# Patient Record
(demographics unavailable — no encounter records)

---

## 2018-09-27 NOTE — EMERGENCY ROOM REPORT
History of Present Illness


General


Chief Complaint:  Alcohol Intoxication


Source:  Patient





Present Illness


HPI


Patient presents with reports of alcohol ingestion


Patient himself initially was somewhat arousable and did admit to drinking 

alcohol over soon becomes more somnolent





Paramedics denies any trauma at the scene


History of present illness is mildly limited as the patient awakens for minimal 

questioning but soon after falls asleep





There was no reports of any vomiting at the scene patient at this time denies 

any chest pain





Patient History


Limited by:  medical condition


Past Medical History:  see triage record


Pertinent Family History:  none


Reviewed Nursing Documentation:  PMH: Agreed; PSxH: Agreed





Nursing Documentation-PM


Past Medical History:  No Stated History





Review of Systems


All Other Systems:  limited - Other than the ones mentioned in the history of 

present illness all others are reviewed however they do stay limited due to the 

patient's mental status





Physical Exam





Vital Signs








  Date Time  Temp Pulse Resp B/P (MAP) Pulse Ox O2 Delivery O2 Flow Rate FiO2


 


9/26/18 22:03  95 20 131/85 98 Room Air  








Sp02 EP Interpretation:  reviewed, normal


General Appearance:  no apparent distress - However mildly disheveled


Head:  normocephalic, atraumatic


Eyes:  bilateral eye PERRL, bilateral eye EOMI


ENT:  normal pharynx, no angioedema


Neck:  supple


Respiratory:  lungs clear


Cardiovascular #1:  regular rate, rhythm


Gastrointestinal:  non tender, soft, no mass


Musculoskeletal:  other - Patient awakens minimally, moves all extremities 

without focal deficit


Neurologic:  other - Patient responsive to physical and verbal stimuli, however 

becomes somnolent and resting soon after


Skin:  normal color, no rash


Lymphatic:  no adenopathy





Medical Decision Making


Diagnostic Impression:  


 Primary Impression:  


 Alcohol abuse


ER Course


Patient does have high order of alcohol on his breath as well





At this time admits to drinking alcohol


Patient is responsive maintaining appropriate airway





No obvious focality is seen and patient has no other obvious scalp hematoma or 

trauma patient is allowed to rest further and pending further sobering





After prolonged observation in the morning time patient has awakened


Reports that he had drank alcohol heavily last night


However at this time feels significantly improved patient is speaking clearly 

ambulating appropriately


And reports that he will be taking the bus home





Last Vital Signs








  Date Time  Temp Pulse Resp B/P (MAP) Pulse Ox O2 Delivery O2 Flow Rate FiO2


 


9/26/18 22:03  95 20 131/85 98 Room Air  








Status:  improved


Disposition:  HOME, SELF-CARE


Condition:  Improved


Referrals:  


NOT CHOSEN [facility],REFERRING (PCP)





Additional Instructions:  


Patient is provided with the discharge instructions notified to follow up with 

primary doctor in the next 2-3 days otherwise return to the er with any 

worsening symptoms.


Please note that this report is being documented using DRAGON technology.  This 

can lead to erroneous entry secondary to incorrect interpretation by the 

dictating instrument.











Cora Sims DO Sep 27, 2018 01:54

## 2018-09-28 NOTE — EMERGENCY ROOM REPORT
History of Present Illness


General


Chief Complaint:  Alcohol Intoxication


Source:  Patient





Present Illness


HPI


Patient present by paramedics for sleeping on the street


Am told by nursing staff and hospital staff that the patient was just recently 

disposition from the hospital





He was also dispositioned earlier in the morning on the 27th on my shift


However this appears to be a second visit earlier in the evening


I do not see it on his current medical records however





Patient is awake at this time he appears significantly more sober than his 

initial presentation yesterday


Patient is awake and reports that he was drinking earlier today


Allergies:  


Coded Allergies:  


     No Known Allergies (Unverified , 9/28/18)





Patient History


Past Medical History:  see triage record


Pertinent Family History:  none


Reviewed Nursing Documentation:  PMH: Agreed; PSxH: Agreed





Nursing Documentation-PMH


Past Medical History:  No Stated History





Review of Systems


All Other Systems:  negative except mentioned in HPI





Physical Exam





Vital Signs








  Date Time  Temp Pulse Resp B/P (MAP) Pulse Ox O2 Delivery O2 Flow Rate FiO2


 


9/28/18 01:15 98.5 100 18 144/100 98 Room Air  





 98.4       








Sp02 EP Interpretation:  reviewed, normal


General Appearance:  well appearing, no apparent distress


Head:  normocephalic, atraumatic


Eyes:  bilateral eye PERRL, bilateral eye EOMI


ENT:  hearing grossly normal, normal pharynx, TMs + canals normal, uvula midline


Neck:  full range of motion, supple, no meningismus, no bony tend


Respiratory:  lungs clear, normal breath sounds, no rhonchi, no respiratory 

distress, no retraction, no accessory muscle use


Cardiovascular #1:  normal peripheral pulses, regular rate, rhythm, no edema, 

no gallop, no JVD, no murmur


Gastrointestinal:  normal bowel sounds, non tender, soft, no mass, no 

organomegaly, non-distended, no guarding, no hernia, no pulsatile mass, no 

rebound


Genitourinary:  no CVA tenderness


Musculoskeletal:  normal inspection


Neurologic:  oriented x3, responsive, CNs III-XII nml as tested, motor strength/

tone normal, sensory intact


Psychiatric:  mood/affect normal


Skin:  normal color, no rash, warm/dry, palpation normal


Lymphatic:  normal inspection, no adenopathy





Medical Decision Making


Diagnostic Impression:  


 Primary Impression:  


 Alcohol abuse


ER Course


Given the patient's GCS 15


Awake and alert status


Patient is allowed to rest


He also admits to drinking alcohol


However there is no sign of vomiting or diarrhea


And the patient is dispositioned in the morning for close outpatient follow-up





Last Vital Signs








  Date Time  Temp Pulse Resp B/P (MAP) Pulse Ox O2 Delivery O2 Flow Rate FiO2


 


9/28/18 01:24 98.5 100 18 144/100 98 Room Air  





 98.5       








Status:  improved


Disposition:  HOME, SELF-CARE


Condition:  Improved


Referrals:  


[facility],REFERRING (PCP)





Additional Instructions:  


Patient is provided with the discharge instructions notified to follow up with 

primary doctor in the next 2-3 days otherwise return to the er with any 

worsening symptoms.


Please note that this report is being documented using DRAGON technology.  This 

can lead to erroneous entry secondary to incorrect interpretation by the 

dictating instrument.











Cora Sims DO Sep 28, 2018 04:00